# Patient Record
Sex: MALE | Race: WHITE | NOT HISPANIC OR LATINO | URBAN - METROPOLITAN AREA
[De-identification: names, ages, dates, MRNs, and addresses within clinical notes are randomized per-mention and may not be internally consistent; named-entity substitution may affect disease eponyms.]

---

## 2018-01-02 ENCOUNTER — EMERGENCY (EMERGENCY)
Facility: HOSPITAL | Age: 28
LOS: 1 days | Discharge: HOME | End: 2018-01-02
Attending: EMERGENCY MEDICINE | Admitting: EMERGENCY MEDICINE
Payer: COMMERCIAL

## 2018-01-02 VITALS
DIASTOLIC BLOOD PRESSURE: 81 MMHG | RESPIRATION RATE: 16 BRPM | HEART RATE: 82 BPM | OXYGEN SATURATION: 99 % | TEMPERATURE: 98 F | SYSTOLIC BLOOD PRESSURE: 115 MMHG

## 2018-01-02 VITALS
TEMPERATURE: 98 F | WEIGHT: 220.02 LBS | HEIGHT: 67 IN | RESPIRATION RATE: 17 BRPM | HEART RATE: 76 BPM | SYSTOLIC BLOOD PRESSURE: 144 MMHG | OXYGEN SATURATION: 98 % | DIASTOLIC BLOOD PRESSURE: 65 MMHG

## 2018-01-02 PROCEDURE — 99283 EMERGENCY DEPT VISIT LOW MDM: CPT | Mod: 25

## 2018-01-02 PROCEDURE — 99282 EMERGENCY DEPT VISIT SF MDM: CPT | Mod: 25

## 2018-01-02 PROCEDURE — 12001 RPR S/N/AX/GEN/TRNK 2.5CM/<: CPT

## 2018-01-02 NOTE — ED ADULT NURSE NOTE - OBJECTIVE STATEMENT
pt states he was home working on his car and sliced his left fifth finger. pt states he is up to date with tetanus.

## 2018-01-02 NOTE — ED PROVIDER NOTE - PROGRESS NOTE DETAILS
Discussed at length with patient / patients family in regards to the wound. Discussed wound care instructions, including short term care and long term / scar treatment and precautions. Also discussed infection precautions, including signs and symptoms to watch for and proper care / treatment. Patient will be following up with outpatient doctor as soon as possible and will otherwise return to ED with any changes, concerns or issues. Demonstration of understanding of all instructions / precautions was verbalized.

## 2018-01-02 NOTE — ED PROVIDER NOTE - OBJECTIVE STATEMENT
26 yo M p/w cut L pinky while working on his car ~ 1 hr pta. pt co lac to L pinky. No numb/ting/focal weak. No fever/chills. min bleeding. No focal weakness. NO numbness. No other inj or co.

## 2018-01-02 NOTE — ED PROVIDER NOTE - PHYSICAL EXAMINATION
L hand: pos 1cm lac on dosal prox pinky, ~ PIP. Nl dist str./sens equal bl with no pain at lac site / finger. Nl cap refill. nl rest of hand L hand: pos 1cm lac on dosal prox pinky, ~ mid phlange. Nl dist str./sens equal bl with no pain at lac site / finger. Nl cap refill. nl rest of hand

## 2018-06-07 ENCOUNTER — EMERGENCY (EMERGENCY)
Facility: HOSPITAL | Age: 28
LOS: 1 days | Discharge: HOME | End: 2018-06-07
Attending: EMERGENCY MEDICINE
Payer: COMMERCIAL

## 2018-06-07 VITALS
TEMPERATURE: 99 F | OXYGEN SATURATION: 100 % | SYSTOLIC BLOOD PRESSURE: 164 MMHG | DIASTOLIC BLOOD PRESSURE: 50 MMHG | HEIGHT: 67 IN | RESPIRATION RATE: 14 BRPM | WEIGHT: 220.02 LBS | HEART RATE: 70 BPM

## 2018-06-07 PROCEDURE — 99283 EMERGENCY DEPT VISIT LOW MDM: CPT

## 2018-06-07 PROCEDURE — 73130 X-RAY EXAM OF HAND: CPT | Mod: 26,RT

## 2018-06-07 PROCEDURE — 73130 X-RAY EXAM OF HAND: CPT

## 2018-06-07 PROCEDURE — 99283 EMERGENCY DEPT VISIT LOW MDM: CPT | Mod: 25

## 2018-06-07 NOTE — ED ADULT NURSE NOTE - OBJECTIVE STATEMENT
PT ARRIVED IN ED ALERT AND RESPONSIVE WITH COMPLAINTS OF RIGHT HAND PAIN DUE TO HITTING IT WHILE WORKING ON HIS CAR. PT IN NO DISTRESS. MILD SWELLING NOTED AND SMALL ABRASION. PT IN NO DISTRESS.

## 2021-10-22 NOTE — ED PROVIDER NOTE - NS HIV RISK FACTOR YES
Declined [General Appearance - Alert] : alert [General Appearance - Well Developed] : interactive [General Appearance - Well-Appearing] : well appearing [General Appearance - In No Acute Distress] : in no acute distress [Appearance Of Head] : the head was normocephalic [Evidence Of Head Injury] : threre was no evidence of injury [Sclera] : the sclera and conjunctiva were normal [PERRL With Normal Accommodation] : pupils were equal in size, round, reactive to light, with normal accommodation [Extraocular Movements] : extraocular movements were intact [Outer Ear] : the ears and nose were normal in appearance [Neck Cervical Mass (___cm)] : no neck mass was observed [Respiration, Rhythm And Depth] : normal respiratory rhythm and effort [Auscultation Breath Sounds / Voice Sounds] : clear bilateral breath sounds [Heart Rate And Rhythm] : heart rate and rhythm were normal [Heart Sounds] : normal S1 and S2 [Murmurs] : no murmurs [Abnormal Walk] : normal gait [Skin Color & Pigmentation] : normal skin color and pigmentation [] : no significant rash [Skin Lesions] : no skin lesions [Initial Inspection: Infant Active And Alert] : active and alert

## 2021-11-18 NOTE — ED PROVIDER NOTE - PMH
How Severe Are Your Spot(S)?: mild What Is The Reason For Today's Visit?: Full Body Skin Examination What Is The Reason For Today's Visit? (Being Monitored For X): concerning skin lesions on an annual basis No pertinent past medical history